# Patient Record
(demographics unavailable — no encounter records)

---

## 2025-04-11 NOTE — ASSESSMENT
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

## 2025-04-11 NOTE — PHYSICAL EXAM
[FreeTextEntry1] : A/A/ Ox person , place day Thoughts are concrete with a Temple content + psychomotor slowing good attention good mood interacts appropriately no hallucination No drift no dysmetria stable gait.

## 2025-04-11 NOTE — HISTORY OF PRESENT ILLNESS
[FreeTextEntry1] : Wiliam is here for the F/U Last visit was in March 2024 . He has been doing well . His blood  test done in August showed : Lamictal level 8.7 and Lacosamide level of 5.69 However he says in the course of last few months he did have events/ incidents  that he is not sure whether they were seizure or not.  one event was waking up with a bruise on the right toe and another one with a bruise on the right elbow and the third event a bump on the right frontal region. He denies having had any other signs or symptoms  No tongue bite. No blood in the mouth No Tod's  No precipitating cause for the seizure He is stating that he is very compliant with his meds No fever He did not  see his PMD for a while